# Patient Record
Sex: MALE | Race: BLACK OR AFRICAN AMERICAN
[De-identification: names, ages, dates, MRNs, and addresses within clinical notes are randomized per-mention and may not be internally consistent; named-entity substitution may affect disease eponyms.]

---

## 2017-03-07 ENCOUNTER — HOSPITAL ENCOUNTER (OUTPATIENT)
Dept: HOSPITAL 15 - CHF HDHVI | Age: 67
Discharge: HOME | End: 2017-03-07
Attending: INTERNAL MEDICINE
Payer: MEDICARE

## 2017-03-07 VITALS — SYSTOLIC BLOOD PRESSURE: 126 MMHG | DIASTOLIC BLOOD PRESSURE: 80 MMHG

## 2017-03-07 DIAGNOSIS — I10: Primary | ICD-10-CM

## 2017-03-07 DIAGNOSIS — E83.40: ICD-10-CM

## 2017-03-07 DIAGNOSIS — D64.9: ICD-10-CM

## 2017-03-07 DIAGNOSIS — E11.9: ICD-10-CM

## 2017-03-07 LAB
ALBUMIN SERPL-MCNC: 3.7 G/DL (ref 3.4–5)
ALP SERPL-CCNC: 114 U/L (ref 45–117)
ANION GAP SERPL CALCULATED.3IONS-SCNC: 13 MMOL/L (ref 5–15)
BILIRUB SERPL-MCNC: 0.4 MG/DL (ref 0.2–1)
BUN SERPL-MCNC: 26 MG/DL (ref 7–18)
BUN/CREAT SERPL: 18.3
CALCIUM SERPL-MCNC: 8.7 MG/DL (ref 8.5–10.1)
CHLORIDE SERPL-SCNC: 112 MMOL/L (ref 98–107)
CO2 SERPL-SCNC: 21 MMOL/L (ref 21–32)
DEFINITIVE: (no result)
GLUCOSE SERPL-MCNC: 97 MG/DL (ref 74–106)
HCT VFR BLD AUTO: 30.8 % (ref 41–53)
HGB BLD-MCNC: 9.6 G/DL (ref 13.5–17.5)
MAGNESIUM SERPL-MCNC: 2.4 MG/DL (ref 1.6–2.6)
MCH RBC QN AUTO: 21.3 PG (ref 28–32)
MCV RBC AUTO: 68.4 FL (ref 80–100)
NRBC BLD QL AUTO: 0 %
POTASSIUM SERPL-SCNC: 4 MMOL/L (ref 3.5–5.1)
PROT SERPL-MCNC: 7.5 G/DL (ref 6.4–8.2)
SCHISTOCYTES BLD QL SMEAR: (no result)
SODIUM SERPL-SCNC: 146 MMOL/L (ref 136–145)
SUSPECT: (no result)

## 2017-03-07 PROCEDURE — 83036 HEMOGLOBIN GLYCOSYLATED A1C: CPT

## 2017-03-07 PROCEDURE — 80053 COMPREHEN METABOLIC PANEL: CPT

## 2017-03-07 PROCEDURE — 85025 COMPLETE CBC W/AUTO DIFF WBC: CPT

## 2017-03-07 PROCEDURE — 36415 COLL VENOUS BLD VENIPUNCTURE: CPT

## 2017-03-07 PROCEDURE — 83735 ASSAY OF MAGNESIUM: CPT

## 2017-03-07 PROCEDURE — 96372 THER/PROPH/DIAG INJ SC/IM: CPT

## 2017-03-07 PROCEDURE — 93701 BIOIMPEDANCE CV ANALYSIS: CPT

## 2017-03-14 ENCOUNTER — HOSPITAL ENCOUNTER (OUTPATIENT)
Dept: HOSPITAL 15 - CHF HDHVI | Age: 67
Discharge: HOME | End: 2017-03-14
Attending: INTERNAL MEDICINE
Payer: MEDICARE

## 2017-03-14 VITALS — SYSTOLIC BLOOD PRESSURE: 105 MMHG | DIASTOLIC BLOOD PRESSURE: 70 MMHG

## 2017-03-14 DIAGNOSIS — E11.9: ICD-10-CM

## 2017-03-14 DIAGNOSIS — I11.0: Primary | ICD-10-CM

## 2017-03-14 DIAGNOSIS — D64.9: ICD-10-CM

## 2017-03-14 DIAGNOSIS — I50.9: ICD-10-CM

## 2017-03-14 LAB
INR PPP: 2.5 (ref 0.7–1.3)
PROTHROMBIN TIME: 30 SEC (ref 9–12)

## 2017-03-14 PROCEDURE — 85610 PROTHROMBIN TIME: CPT

## 2017-03-14 PROCEDURE — 96372 THER/PROPH/DIAG INJ SC/IM: CPT

## 2017-03-29 ENCOUNTER — HOSPITAL ENCOUNTER (OUTPATIENT)
Dept: HOSPITAL 15 - CHF HDHVI | Age: 67
Discharge: HOME | End: 2017-03-29
Attending: INTERNAL MEDICINE
Payer: MEDICARE

## 2017-03-29 VITALS — DIASTOLIC BLOOD PRESSURE: 63 MMHG | SYSTOLIC BLOOD PRESSURE: 115 MMHG

## 2017-03-29 VITALS — WEIGHT: 1.06 LBS | BODY MASS INDEX: 0.21 KG/M2 | HEIGHT: 60 IN

## 2017-03-29 VITALS — SYSTOLIC BLOOD PRESSURE: 109 MMHG | DIASTOLIC BLOOD PRESSURE: 59 MMHG

## 2017-03-29 DIAGNOSIS — R94.4: ICD-10-CM

## 2017-03-29 DIAGNOSIS — I10: ICD-10-CM

## 2017-03-29 DIAGNOSIS — D64.9: ICD-10-CM

## 2017-03-29 DIAGNOSIS — E55.9: ICD-10-CM

## 2017-03-29 DIAGNOSIS — I50.9: Primary | ICD-10-CM

## 2017-03-29 LAB
BUN SERPL-MCNC: 20 MG/DL (ref 7–18)
DEFINITIVE: (no result)
HCT VFR BLD AUTO: 32.6 % (ref 41–53)
HGB BLD-MCNC: 10.1 G/DL (ref 13.5–17.5)
INR PPP: 2.7 (ref 0.7–1.3)
MCH RBC QN AUTO: 21.6 PG (ref 28–32)
MCV RBC AUTO: 69.3 FL (ref 80–100)
NRBC BLD QL AUTO: 0 %
POTASSIUM SERPL-SCNC: 4.3 MMOL/L (ref 3.5–5.1)
PROTHROMBIN TIME: 32.1 SEC (ref 9–12)

## 2017-03-29 PROCEDURE — 82962 GLUCOSE BLOOD TEST: CPT

## 2017-03-29 PROCEDURE — 82565 ASSAY OF CREATININE: CPT

## 2017-03-29 PROCEDURE — 36415 COLL VENOUS BLD VENIPUNCTURE: CPT

## 2017-03-29 PROCEDURE — 85025 COMPLETE CBC W/AUTO DIFF WBC: CPT

## 2017-03-29 PROCEDURE — 84520 ASSAY OF UREA NITROGEN: CPT

## 2017-03-29 PROCEDURE — 82306 VITAMIN D 25 HYDROXY: CPT

## 2017-03-29 PROCEDURE — 85610 PROTHROMBIN TIME: CPT

## 2017-03-29 PROCEDURE — 96372 THER/PROPH/DIAG INJ SC/IM: CPT

## 2017-03-29 PROCEDURE — 84132 ASSAY OF SERUM POTASSIUM: CPT

## 2017-05-04 ENCOUNTER — HOSPITAL ENCOUNTER (OUTPATIENT)
Dept: HOSPITAL 15 - CHF HDHVI | Age: 67
Discharge: HOME | End: 2017-05-04
Attending: INTERNAL MEDICINE
Payer: MEDICARE

## 2017-05-04 VITALS — SYSTOLIC BLOOD PRESSURE: 135 MMHG | DIASTOLIC BLOOD PRESSURE: 73 MMHG

## 2017-05-04 VITALS — DIASTOLIC BLOOD PRESSURE: 82 MMHG | SYSTOLIC BLOOD PRESSURE: 128 MMHG

## 2017-05-04 DIAGNOSIS — I11.0: Primary | ICD-10-CM

## 2017-05-04 DIAGNOSIS — E78.5: ICD-10-CM

## 2017-05-04 DIAGNOSIS — E11.9: ICD-10-CM

## 2017-05-04 DIAGNOSIS — Z95.1: ICD-10-CM

## 2017-05-04 DIAGNOSIS — I25.10: ICD-10-CM

## 2017-05-04 DIAGNOSIS — E55.9: ICD-10-CM

## 2017-05-04 DIAGNOSIS — I73.9: ICD-10-CM

## 2017-05-04 DIAGNOSIS — D51.9: ICD-10-CM

## 2017-05-04 LAB
INR PPP: 1.1 (ref 0.7–1.3)
PROTHROMBIN TIME: 13.6 SEC (ref 9–12)

## 2017-05-04 PROCEDURE — 85610 PROTHROMBIN TIME: CPT

## 2017-05-04 PROCEDURE — 96372 THER/PROPH/DIAG INJ SC/IM: CPT

## 2018-11-08 ENCOUNTER — HOSPITAL ENCOUNTER (OUTPATIENT)
Dept: HOSPITAL 15 - RAD HDHVI | Age: 68
Discharge: HOME | End: 2018-11-08
Attending: INTERNAL MEDICINE
Payer: MEDICARE

## 2018-11-08 VITALS — WEIGHT: 198 LBS | HEIGHT: 73 IN | BODY MASS INDEX: 26.24 KG/M2

## 2018-11-08 DIAGNOSIS — R19.09: ICD-10-CM

## 2018-11-08 DIAGNOSIS — I11.0: ICD-10-CM

## 2018-11-08 DIAGNOSIS — I50.23: ICD-10-CM

## 2018-11-08 DIAGNOSIS — I42.0: Primary | ICD-10-CM

## 2018-11-08 DIAGNOSIS — E78.00: ICD-10-CM

## 2018-11-08 DIAGNOSIS — E11.9: ICD-10-CM

## 2018-11-08 PROCEDURE — 78452 HT MUSCLE IMAGE SPECT MULT: CPT

## 2018-11-08 PROCEDURE — 96374 THER/PROPH/DIAG INJ IV PUSH: CPT

## 2018-11-08 PROCEDURE — 93005 ELECTROCARDIOGRAM TRACING: CPT

## 2018-11-08 PROCEDURE — 96375 TX/PRO/DX INJ NEW DRUG ADDON: CPT

## 2018-11-09 ENCOUNTER — HOSPITAL ENCOUNTER (OUTPATIENT)
Dept: HOSPITAL 15 - RAD HDHVI | Age: 68
Discharge: HOME | End: 2018-11-09
Attending: INTERNAL MEDICINE
Payer: MEDICARE

## 2018-11-09 DIAGNOSIS — I42.0: Primary | ICD-10-CM

## 2018-11-09 DIAGNOSIS — E11.22: ICD-10-CM

## 2018-11-09 DIAGNOSIS — N18.2: ICD-10-CM

## 2018-11-09 DIAGNOSIS — I13.0: ICD-10-CM

## 2018-11-09 DIAGNOSIS — I50.23: ICD-10-CM

## 2018-11-09 PROCEDURE — 93306 TTE W/DOPPLER COMPLETE: CPT

## 2018-11-12 ENCOUNTER — HOSPITAL ENCOUNTER (OUTPATIENT)
Dept: HOSPITAL 15 - RAD HDHVI | Age: 68
Discharge: HOME | End: 2018-11-12
Attending: INTERNAL MEDICINE
Payer: MEDICARE

## 2018-11-12 DIAGNOSIS — I10: ICD-10-CM

## 2018-11-12 DIAGNOSIS — R06.02: ICD-10-CM

## 2018-11-12 DIAGNOSIS — R79.1: ICD-10-CM

## 2018-11-12 DIAGNOSIS — D64.9: ICD-10-CM

## 2018-11-12 DIAGNOSIS — Z01.818: Primary | ICD-10-CM

## 2018-11-12 LAB
ANION GAP SERPL CALCULATED.3IONS-SCNC: 8 MMOL/L (ref 5–15)
APTT PPP: 26.5 SEC (ref 23.78–33.04)
BUN SERPL-MCNC: 43 MG/DL (ref 7–18)
BUN/CREAT SERPL: 25.9
CALCIUM SERPL-MCNC: 8.6 MG/DL (ref 8.5–10.1)
CHLORIDE SERPL-SCNC: 113 MMOL/L (ref 98–107)
CO2 SERPL-SCNC: 21 MMOL/L (ref 21–32)
GLUCOSE SERPL-MCNC: 62 MG/DL (ref 74–106)
HCT VFR BLD AUTO: 35.5 % (ref 41–53)
HGB BLD-MCNC: 11.4 G/DL (ref 13.5–17.5)
INR PPP: 1 (ref 0.9–1.15)
MCH RBC QN AUTO: 26.3 PG (ref 28–32)
MCV RBC AUTO: 81.9 FL (ref 80–100)
NRBC BLD QL AUTO: 0.4 %
POTASSIUM SERPL-SCNC: 3.6 MMOL/L (ref 3.5–5.1)
PROTHROMBIN TIME: 10.7 SEC (ref 9.27–12.13)
SODIUM SERPL-SCNC: 142 MMOL/L (ref 136–145)

## 2018-11-12 PROCEDURE — 85025 COMPLETE CBC W/AUTO DIFF WBC: CPT

## 2018-11-12 PROCEDURE — 71046 X-RAY EXAM CHEST 2 VIEWS: CPT

## 2018-11-12 PROCEDURE — 36415 COLL VENOUS BLD VENIPUNCTURE: CPT

## 2018-11-12 PROCEDURE — 85610 PROTHROMBIN TIME: CPT

## 2018-11-12 PROCEDURE — 80048 BASIC METABOLIC PNL TOTAL CA: CPT

## 2018-11-12 PROCEDURE — 85730 THROMBOPLASTIN TIME PARTIAL: CPT

## 2018-11-15 ENCOUNTER — HOSPITAL ENCOUNTER (OUTPATIENT)
Dept: HOSPITAL 15 - LAB | Age: 68
Discharge: HOME | End: 2018-11-15
Attending: INTERNAL MEDICINE
Payer: MEDICARE

## 2018-11-15 DIAGNOSIS — N39.0: Primary | ICD-10-CM

## 2018-11-15 PROCEDURE — 87086 URINE CULTURE/COLONY COUNT: CPT

## 2018-11-15 PROCEDURE — 81003 URINALYSIS AUTO W/O SCOPE: CPT

## 2019-08-19 ENCOUNTER — HOSPITAL ENCOUNTER (OUTPATIENT)
Dept: HOSPITAL 15 - CHF HDHVI | Age: 69
Discharge: HOME | End: 2019-08-19
Attending: INTERNAL MEDICINE
Payer: MEDICARE

## 2019-08-19 VITALS — DIASTOLIC BLOOD PRESSURE: 70 MMHG | SYSTOLIC BLOOD PRESSURE: 121 MMHG

## 2019-08-19 DIAGNOSIS — E83.40: ICD-10-CM

## 2019-08-19 DIAGNOSIS — I50.9: ICD-10-CM

## 2019-08-19 DIAGNOSIS — I11.0: Primary | ICD-10-CM

## 2019-08-19 DIAGNOSIS — D64.9: ICD-10-CM

## 2019-08-19 LAB
ANION GAP SERPL CALCULATED.3IONS-SCNC: 7 MMOL/L (ref 5–15)
BUN SERPL-MCNC: 23 MG/DL (ref 7–18)
BUN/CREAT SERPL: 19.7
CALCIUM SERPL-MCNC: 8.3 MG/DL (ref 8.5–10.1)
CHLORIDE SERPL-SCNC: 118 MMOL/L (ref 98–107)
CO2 SERPL-SCNC: 21 MMOL/L (ref 21–32)
GLUCOSE SERPL-MCNC: 64 MG/DL (ref 74–106)
HCT VFR BLD AUTO: 30 % (ref 41–53)
HGB BLD-MCNC: 9.2 G/DL (ref 13.5–17.5)
MAGNESIUM SERPL-MCNC: 2.3 MG/DL (ref 1.6–2.6)
MCH RBC QN AUTO: 24.7 PG (ref 28–32)
MCV RBC AUTO: 81 FL (ref 80–100)
NRBC BLD QL AUTO: 0.2 %
POTASSIUM SERPL-SCNC: 3.7 MMOL/L (ref 3.5–5.1)
SODIUM SERPL-SCNC: 146 MMOL/L (ref 136–145)

## 2019-08-19 PROCEDURE — 71046 X-RAY EXAM CHEST 2 VIEWS: CPT

## 2019-08-19 PROCEDURE — 36415 COLL VENOUS BLD VENIPUNCTURE: CPT

## 2019-08-19 PROCEDURE — 83735 ASSAY OF MAGNESIUM: CPT

## 2019-08-19 PROCEDURE — 93701 BIOIMPEDANCE CV ANALYSIS: CPT

## 2019-08-19 PROCEDURE — 80048 BASIC METABOLIC PNL TOTAL CA: CPT

## 2019-08-19 PROCEDURE — 85025 COMPLETE CBC W/AUTO DIFF WBC: CPT

## 2019-08-19 PROCEDURE — 83880 ASSAY OF NATRIURETIC PEPTIDE: CPT

## 2019-08-19 NOTE — NUR
CHF

PT ARRIVED TO THE CHF CLINIC TO RESUME CHF FOLLOW UP CARE. NOT SEEN SINCE 2017 . PT A/O X 3 
VSS, WOUND VAC TO THE LEFT  FOOT CHRONIC DM ULCER.

## 2019-08-19 NOTE — NUR
Discharge Instructions

See e-MAR for any mediations given with this visit.  Patient education given on disease 
process. Patient verbalized understanding.  Previous labs reviewed.  Patient discharged in 
stable condition with after care instructions and follow up appointment.



CARDIODYNAMIC AND CHEST XRAY COMPLETE

-------------------------------------------------------------------------------

Addendum: 08/19/19 at 1708 by THIAGO DOMINGUEZ RN RN HI

-------------------------------------------------------------------------------

FOLLOW UP IN 1 WEEK

## 2019-08-28 ENCOUNTER — HOSPITAL ENCOUNTER (OUTPATIENT)
Dept: HOSPITAL 15 - RAD HDHVI | Age: 69
Discharge: HOME | End: 2019-08-28
Attending: INTERNAL MEDICINE
Payer: MEDICARE

## 2019-08-28 DIAGNOSIS — I70.201: Primary | ICD-10-CM

## 2019-08-28 DIAGNOSIS — R10.9: ICD-10-CM

## 2019-08-28 PROCEDURE — 93926 LOWER EXTREMITY STUDY: CPT

## 2019-09-06 ENCOUNTER — HOSPITAL ENCOUNTER (OUTPATIENT)
Dept: HOSPITAL 15 - RAD HDHVI | Age: 69
Discharge: HOME | End: 2019-09-06
Attending: INTERNAL MEDICINE
Payer: MEDICARE

## 2019-09-06 VITALS — WEIGHT: 193 LBS | BODY MASS INDEX: 25.58 KG/M2 | HEIGHT: 73 IN

## 2019-09-06 DIAGNOSIS — E78.00: ICD-10-CM

## 2019-09-06 DIAGNOSIS — I25.10: ICD-10-CM

## 2019-09-06 DIAGNOSIS — Z95.1: ICD-10-CM

## 2019-09-06 DIAGNOSIS — I50.9: ICD-10-CM

## 2019-09-06 DIAGNOSIS — I11.0: ICD-10-CM

## 2019-09-06 DIAGNOSIS — R07.9: Primary | ICD-10-CM

## 2019-09-06 DIAGNOSIS — I21.9: ICD-10-CM

## 2019-09-06 DIAGNOSIS — F17.200: ICD-10-CM

## 2019-09-06 PROCEDURE — 78452 HT MUSCLE IMAGE SPECT MULT: CPT

## 2019-09-06 PROCEDURE — 93005 ELECTROCARDIOGRAM TRACING: CPT

## 2019-09-06 PROCEDURE — 96374 THER/PROPH/DIAG INJ IV PUSH: CPT

## 2019-09-06 PROCEDURE — 96375 TX/PRO/DX INJ NEW DRUG ADDON: CPT

## 2019-09-09 ENCOUNTER — HOSPITAL ENCOUNTER (OUTPATIENT)
Dept: HOSPITAL 15 - CHF HDHVI | Age: 69
Discharge: HOME | End: 2019-09-09
Attending: INTERNAL MEDICINE
Payer: MEDICARE

## 2019-09-09 VITALS — DIASTOLIC BLOOD PRESSURE: 64 MMHG | SYSTOLIC BLOOD PRESSURE: 124 MMHG

## 2019-09-09 VITALS — DIASTOLIC BLOOD PRESSURE: 54 MMHG | SYSTOLIC BLOOD PRESSURE: 137 MMHG

## 2019-09-09 DIAGNOSIS — N18.9: ICD-10-CM

## 2019-09-09 DIAGNOSIS — E55.9: ICD-10-CM

## 2019-09-09 DIAGNOSIS — R03.0: ICD-10-CM

## 2019-09-09 DIAGNOSIS — I13.0: Primary | ICD-10-CM

## 2019-09-09 DIAGNOSIS — L97.509: ICD-10-CM

## 2019-09-09 DIAGNOSIS — E11.22: ICD-10-CM

## 2019-09-09 DIAGNOSIS — I50.9: ICD-10-CM

## 2019-09-09 PROCEDURE — 85610 PROTHROMBIN TIME: CPT

## 2019-09-09 NOTE — NUR
IN FOR CHF FOLLOWUP WITH WIFE IN ATTENDANCE. VS WNL. REVIEWED LABS AND STATUS. WRITTEN AND 
VERBAL INSTRUCTION ON ADDING HIGH POTASSIUM FOODS TO DIET. VERBAL INSTRUCTION AND 
DEMONSTRATION ON ADDING VITAMIN D SPRAY TO  DAILY REGIMEN.  REPEAT BACK INSTRUCTIONS 
OBTAINED. INR CHECKED DUE TO PATIENTS STATING ON BLOOD THINNERS. DISCHARGED TO SELF CARE IN 
NO DISTRESS WITH WIFE IN ATTENDANCE.

## 2019-10-09 ENCOUNTER — HOSPITAL ENCOUNTER (OUTPATIENT)
Dept: HOSPITAL 15 - CHF HDHVI | Age: 69
Discharge: HOME | End: 2019-10-09
Attending: INTERNAL MEDICINE
Payer: MEDICARE

## 2019-10-09 VITALS — SYSTOLIC BLOOD PRESSURE: 127 MMHG | DIASTOLIC BLOOD PRESSURE: 66 MMHG

## 2019-10-09 VITALS — SYSTOLIC BLOOD PRESSURE: 107 MMHG | DIASTOLIC BLOOD PRESSURE: 71 MMHG

## 2019-10-09 DIAGNOSIS — E78.00: ICD-10-CM

## 2019-10-09 DIAGNOSIS — D64.9: ICD-10-CM

## 2019-10-09 DIAGNOSIS — I25.10: ICD-10-CM

## 2019-10-09 DIAGNOSIS — E11.22: ICD-10-CM

## 2019-10-09 DIAGNOSIS — E83.40: ICD-10-CM

## 2019-10-09 DIAGNOSIS — N18.9: ICD-10-CM

## 2019-10-09 DIAGNOSIS — Z87.891: ICD-10-CM

## 2019-10-09 DIAGNOSIS — Z95.1: ICD-10-CM

## 2019-10-09 DIAGNOSIS — I13.0: Primary | ICD-10-CM

## 2019-10-09 DIAGNOSIS — I50.22: ICD-10-CM

## 2019-10-09 DIAGNOSIS — I48.91: ICD-10-CM

## 2019-10-09 DIAGNOSIS — R53.83: ICD-10-CM

## 2019-10-09 LAB
ALBUMIN SERPL-MCNC: 3.7 G/DL (ref 3.4–5)
ALP SERPL-CCNC: 135 U/L (ref 45–117)
ALT SERPL-CCNC: 34 U/L (ref 16–61)
ANION GAP SERPL CALCULATED.3IONS-SCNC: 7 MMOL/L (ref 5–15)
BILIRUB SERPL-MCNC: 0.3 MG/DL (ref 0.2–1)
BUN SERPL-MCNC: 38 MG/DL (ref 7–18)
BUN/CREAT SERPL: 21.3
CALCIUM SERPL-MCNC: 8.6 MG/DL (ref 8.5–10.1)
CHLORIDE SERPL-SCNC: 110 MMOL/L (ref 98–107)
CO2 SERPL-SCNC: 23 MMOL/L (ref 21–32)
GLUCOSE SERPL-MCNC: 119 MG/DL (ref 74–106)
HCT VFR BLD AUTO: 36.9 % (ref 41–53)
HGB BLD-MCNC: 11.9 G/DL (ref 13.5–17.5)
MAGNESIUM SERPL-MCNC: 2.3 MG/DL (ref 1.6–2.6)
MCH RBC QN AUTO: 29.5 PG (ref 28–32)
MCV RBC AUTO: 91.5 FL (ref 80–100)
NRBC BLD QL AUTO: 0.1 %
POTASSIUM SERPL-SCNC: 3.6 MMOL/L (ref 3.5–5.1)
PROT SERPL-MCNC: 7.3 G/DL (ref 6.4–8.2)
SODIUM SERPL-SCNC: 140 MMOL/L (ref 136–145)

## 2019-10-09 PROCEDURE — 83880 ASSAY OF NATRIURETIC PEPTIDE: CPT

## 2019-10-09 PROCEDURE — 96372 THER/PROPH/DIAG INJ SC/IM: CPT

## 2019-10-09 PROCEDURE — 83735 ASSAY OF MAGNESIUM: CPT

## 2019-10-09 PROCEDURE — 36415 COLL VENOUS BLD VENIPUNCTURE: CPT

## 2019-10-09 PROCEDURE — 80053 COMPREHEN METABOLIC PANEL: CPT

## 2019-10-09 PROCEDURE — 93701 BIOIMPEDANCE CV ANALYSIS: CPT

## 2019-10-09 PROCEDURE — 85025 COMPLETE CBC W/AUTO DIFF WBC: CPT

## 2019-10-09 NOTE — NUR
IN TO CLINIC WITH FAMILY IN ATTENDANCE. LABS REVIEWED. ADDITIONAL LABS DRAWN AND SENT. 
ADMINISTERED MEDICATION PER ORDER. 



MEDICATION ADMINISTRATION

VIT B12 1000 MCG IM TO LEFT DELTOID AT 1335

## 2019-10-11 ENCOUNTER — HOSPITAL ENCOUNTER (OUTPATIENT)
Dept: HOSPITAL 15 - CHF HDHVI | Age: 69
Discharge: HOME | End: 2019-10-11
Attending: INTERNAL MEDICINE
Payer: MEDICARE

## 2019-10-11 DIAGNOSIS — N18.9: ICD-10-CM

## 2019-10-11 DIAGNOSIS — I13.0: ICD-10-CM

## 2019-10-11 DIAGNOSIS — E11.22: ICD-10-CM

## 2019-10-11 DIAGNOSIS — I50.9: ICD-10-CM

## 2019-10-11 DIAGNOSIS — E87.6: Primary | ICD-10-CM

## 2019-10-11 DIAGNOSIS — R94.4: ICD-10-CM

## 2019-10-11 LAB
BUN SERPL-MCNC: 53 MG/DL (ref 7–18)
POTASSIUM SERPL-SCNC: 3.7 MMOL/L (ref 3.5–5.1)

## 2019-10-11 PROCEDURE — 82565 ASSAY OF CREATININE: CPT

## 2019-10-11 PROCEDURE — 84520 ASSAY OF UREA NITROGEN: CPT

## 2019-10-11 PROCEDURE — 84132 ASSAY OF SERUM POTASSIUM: CPT

## 2019-10-11 PROCEDURE — 36415 COLL VENOUS BLD VENIPUNCTURE: CPT

## 2019-10-15 ENCOUNTER — HOSPITAL ENCOUNTER (OUTPATIENT)
Dept: HOSPITAL 15 - CHF HDHVI | Age: 69
Discharge: HOME | End: 2019-10-15
Attending: INTERNAL MEDICINE
Payer: MEDICARE

## 2019-10-15 VITALS — SYSTOLIC BLOOD PRESSURE: 131 MMHG | DIASTOLIC BLOOD PRESSURE: 78 MMHG

## 2019-10-15 VITALS — DIASTOLIC BLOOD PRESSURE: 69 MMHG | SYSTOLIC BLOOD PRESSURE: 117 MMHG

## 2019-10-15 DIAGNOSIS — I50.9: ICD-10-CM

## 2019-10-15 DIAGNOSIS — E87.6: ICD-10-CM

## 2019-10-15 DIAGNOSIS — R94.4: Primary | ICD-10-CM

## 2019-10-15 DIAGNOSIS — E11.22: ICD-10-CM

## 2019-10-15 DIAGNOSIS — N18.9: ICD-10-CM

## 2019-10-15 DIAGNOSIS — I13.0: ICD-10-CM

## 2019-10-15 LAB
BUN SERPL-MCNC: 40 MG/DL (ref 7–18)
POTASSIUM SERPL-SCNC: 4.2 MMOL/L (ref 3.5–5.1)

## 2019-10-15 PROCEDURE — 82565 ASSAY OF CREATININE: CPT

## 2019-10-15 PROCEDURE — 36415 COLL VENOUS BLD VENIPUNCTURE: CPT

## 2019-10-15 PROCEDURE — 84132 ASSAY OF SERUM POTASSIUM: CPT

## 2019-10-15 PROCEDURE — 84520 ASSAY OF UREA NITROGEN: CPT

## 2019-10-15 NOTE — NUR
IN TO CLINIC FOR CHF FOLLOWUP AND REPEAT LABS. PT HAS HAD INCREASED CREATININE WITH REPEAT 
LEVELS DRAWN. MEDICATION RECONCILIATION DONE AND VERIFIED WITH PATIENT AND PATIENTS WIFE. PT 
REPORTS DRINKING PLENTY OF FLUID AND REPORTS COMPLIANCE WITH MEDICATION REGIMEN. NO CHANGES. 
DENIES TAKING ANY ADDITIONAL HERBALS.  LABS DRAWN AND SENT. WILL VERIFY REPEAT RESULTS AND 
FOLLOW WITH DR MAC . PT TO RTC IN 1 WEEK.

## 2019-11-05 ENCOUNTER — HOSPITAL ENCOUNTER (OUTPATIENT)
Dept: HOSPITAL 15 - RAD HDHVI | Age: 69
Discharge: HOME | End: 2019-11-05
Attending: INTERNAL MEDICINE
Payer: MEDICARE

## 2019-11-05 VITALS — SYSTOLIC BLOOD PRESSURE: 146 MMHG | DIASTOLIC BLOOD PRESSURE: 85 MMHG

## 2019-11-05 VITALS — DIASTOLIC BLOOD PRESSURE: 83 MMHG | SYSTOLIC BLOOD PRESSURE: 149 MMHG

## 2019-11-05 DIAGNOSIS — I42.9: ICD-10-CM

## 2019-11-05 DIAGNOSIS — J44.9: ICD-10-CM

## 2019-11-05 DIAGNOSIS — I13.0: ICD-10-CM

## 2019-11-05 DIAGNOSIS — Z95.811: ICD-10-CM

## 2019-11-05 DIAGNOSIS — E11.22: ICD-10-CM

## 2019-11-05 DIAGNOSIS — N18.9: ICD-10-CM

## 2019-11-05 DIAGNOSIS — F17.200: ICD-10-CM

## 2019-11-05 DIAGNOSIS — I50.9: ICD-10-CM

## 2019-11-05 DIAGNOSIS — Z01.812: Primary | ICD-10-CM

## 2019-11-05 LAB
ANION GAP SERPL CALCULATED.3IONS-SCNC: 6 MMOL/L (ref 5–15)
APTT PPP: 28.5 SEC (ref 23.64–32.05)
BUN SERPL-MCNC: 37 MG/DL (ref 7–18)
BUN/CREAT SERPL: 22.6
CALCIUM SERPL-MCNC: 9.2 MG/DL (ref 8.5–10.1)
CHLORIDE SERPL-SCNC: 107 MMOL/L (ref 98–107)
CO2 SERPL-SCNC: 26 MMOL/L (ref 21–32)
GLUCOSE SERPL-MCNC: 110 MG/DL (ref 74–106)
HCT VFR BLD AUTO: 42.7 % (ref 41–53)
HGB BLD-MCNC: 13.8 G/DL (ref 13.5–17.5)
INR PPP: 0.97 (ref 0.9–1.15)
MCH RBC QN AUTO: 30.2 PG (ref 28–32)
MCV RBC AUTO: 93.6 FL (ref 80–100)
NRBC BLD QL AUTO: 0 %
POTASSIUM SERPL-SCNC: 4.2 MMOL/L (ref 3.5–5.1)
SODIUM SERPL-SCNC: 139 MMOL/L (ref 136–145)

## 2019-11-05 PROCEDURE — 85025 COMPLETE CBC W/AUTO DIFF WBC: CPT

## 2019-11-05 PROCEDURE — 85730 THROMBOPLASTIN TIME PARTIAL: CPT

## 2019-11-05 PROCEDURE — 93005 ELECTROCARDIOGRAM TRACING: CPT

## 2019-11-05 PROCEDURE — 80048 BASIC METABOLIC PNL TOTAL CA: CPT

## 2019-11-05 PROCEDURE — 71046 X-RAY EXAM CHEST 2 VIEWS: CPT

## 2019-11-05 PROCEDURE — 36415 COLL VENOUS BLD VENIPUNCTURE: CPT

## 2019-11-05 PROCEDURE — 85610 PROTHROMBIN TIME: CPT

## 2019-11-05 NOTE — NUR
Pre-Op Discharge Summary:

See e-MAR for any medications given for this visit.  Pre-op orders received and carried out 
per MD of EKG, LABS and chest xrays.  Patient given a copy of EKG with instructions to go to 
[facility] out patient for further follow up care.

## 2019-11-07 ENCOUNTER — HOSPITAL ENCOUNTER (OUTPATIENT)
Dept: HOSPITAL 15 - CATH | Age: 69
Discharge: HOME | End: 2019-11-07
Attending: INTERNAL MEDICINE
Payer: MEDICARE

## 2019-11-07 VITALS — HEIGHT: 73 IN | WEIGHT: 194 LBS | BODY MASS INDEX: 25.71 KG/M2

## 2019-11-07 DIAGNOSIS — Z79.899: ICD-10-CM

## 2019-11-07 DIAGNOSIS — I25.2: ICD-10-CM

## 2019-11-07 DIAGNOSIS — J44.9: ICD-10-CM

## 2019-11-07 DIAGNOSIS — Z95.0: ICD-10-CM

## 2019-11-07 DIAGNOSIS — I25.10: ICD-10-CM

## 2019-11-07 DIAGNOSIS — E78.00: ICD-10-CM

## 2019-11-07 DIAGNOSIS — Z85.828: ICD-10-CM

## 2019-11-07 DIAGNOSIS — Z95.1: ICD-10-CM

## 2019-11-07 DIAGNOSIS — Z95.5: ICD-10-CM

## 2019-11-07 DIAGNOSIS — I10: ICD-10-CM

## 2019-11-07 DIAGNOSIS — Z79.84: ICD-10-CM

## 2019-11-07 DIAGNOSIS — E11.9: ICD-10-CM

## 2019-11-07 DIAGNOSIS — F17.210: ICD-10-CM

## 2019-11-07 DIAGNOSIS — I73.9: Primary | ICD-10-CM

## 2019-11-07 PROCEDURE — 99152 MOD SED SAME PHYS/QHP 5/>YRS: CPT

## 2019-11-07 PROCEDURE — 75716 ARTERY X-RAYS ARMS/LEGS: CPT

## 2019-11-07 PROCEDURE — 36246 INS CATH ABD/L-EXT ART 2ND: CPT

## 2019-12-02 ENCOUNTER — HOSPITAL ENCOUNTER (OUTPATIENT)
Dept: HOSPITAL 15 - RAD HDHVI | Age: 69
Discharge: HOME | End: 2019-12-02
Attending: INTERNAL MEDICINE
Payer: MEDICARE

## 2019-12-02 VITALS — DIASTOLIC BLOOD PRESSURE: 81 MMHG | SYSTOLIC BLOOD PRESSURE: 136 MMHG

## 2019-12-02 VITALS — SYSTOLIC BLOOD PRESSURE: 134 MMHG | DIASTOLIC BLOOD PRESSURE: 74 MMHG

## 2019-12-02 DIAGNOSIS — Z87.891: ICD-10-CM

## 2019-12-02 DIAGNOSIS — I11.0: ICD-10-CM

## 2019-12-02 DIAGNOSIS — M47.814: ICD-10-CM

## 2019-12-02 DIAGNOSIS — R05: ICD-10-CM

## 2019-12-02 DIAGNOSIS — J92.9: ICD-10-CM

## 2019-12-02 DIAGNOSIS — Z01.812: Primary | ICD-10-CM

## 2019-12-02 DIAGNOSIS — I50.9: ICD-10-CM

## 2019-12-02 LAB
ANION GAP SERPL CALCULATED.3IONS-SCNC: 7 MMOL/L (ref 5–15)
APTT PPP: 28.5 SEC (ref 23.64–32.05)
BUN SERPL-MCNC: 39 MG/DL (ref 7–18)
BUN/CREAT SERPL: 22.7
CALCIUM SERPL-MCNC: 8.8 MG/DL (ref 8.5–10.1)
CHLORIDE SERPL-SCNC: 111 MMOL/L (ref 98–107)
CO2 SERPL-SCNC: 24 MMOL/L (ref 21–32)
GLUCOSE SERPL-MCNC: 90 MG/DL (ref 74–106)
HCT VFR BLD AUTO: 39.4 % (ref 41–53)
HGB BLD-MCNC: 13 G/DL (ref 13.5–17.5)
INR PPP: 1.06 (ref 0.9–1.15)
MCH RBC QN AUTO: 31 PG (ref 28–32)
MCV RBC AUTO: 94.2 FL (ref 80–100)
NRBC BLD QL AUTO: 0 %
POTASSIUM SERPL-SCNC: 3.8 MMOL/L (ref 3.5–5.1)
SODIUM SERPL-SCNC: 142 MMOL/L (ref 136–145)

## 2019-12-02 PROCEDURE — 85025 COMPLETE CBC W/AUTO DIFF WBC: CPT

## 2019-12-02 PROCEDURE — 71046 X-RAY EXAM CHEST 2 VIEWS: CPT

## 2019-12-02 PROCEDURE — 36415 COLL VENOUS BLD VENIPUNCTURE: CPT

## 2019-12-02 PROCEDURE — 85730 THROMBOPLASTIN TIME PARTIAL: CPT

## 2019-12-02 PROCEDURE — 80048 BASIC METABOLIC PNL TOTAL CA: CPT

## 2019-12-02 PROCEDURE — 93005 ELECTROCARDIOGRAM TRACING: CPT

## 2019-12-02 PROCEDURE — 85610 PROTHROMBIN TIME: CPT

## 2019-12-02 NOTE — NUR
Pre-Op Discharge Summary:

See e-MAR for any medications given for this visit.  Pre-op orders received and carried out 
per MD of EKG, LABS and chest xrays.  Patient given a copy of EKG with instructions to go to 
[facility] out patient for further follow up care.

## 2019-12-05 ENCOUNTER — HOSPITAL ENCOUNTER (INPATIENT)
Dept: HOSPITAL 15 - CATH | Age: 69
LOS: 1 days | Discharge: HOME HEALTH | DRG: 181 | End: 2019-12-06
Attending: INTERNAL MEDICINE | Admitting: INTERNAL MEDICINE
Payer: MEDICARE

## 2019-12-05 VITALS — DIASTOLIC BLOOD PRESSURE: 85 MMHG | SYSTOLIC BLOOD PRESSURE: 134 MMHG

## 2019-12-05 VITALS — BODY MASS INDEX: 25.98 KG/M2 | WEIGHT: 195.99 LBS | HEIGHT: 73 IN

## 2019-12-05 VITALS — SYSTOLIC BLOOD PRESSURE: 117 MMHG | DIASTOLIC BLOOD PRESSURE: 74 MMHG

## 2019-12-05 VITALS — SYSTOLIC BLOOD PRESSURE: 134 MMHG | DIASTOLIC BLOOD PRESSURE: 85 MMHG

## 2019-12-05 DIAGNOSIS — E78.5: ICD-10-CM

## 2019-12-05 DIAGNOSIS — Z82.49: ICD-10-CM

## 2019-12-05 DIAGNOSIS — I70.211: ICD-10-CM

## 2019-12-05 DIAGNOSIS — E11.51: Primary | ICD-10-CM

## 2019-12-05 DIAGNOSIS — Z79.899: ICD-10-CM

## 2019-12-05 DIAGNOSIS — Z95.1: ICD-10-CM

## 2019-12-05 DIAGNOSIS — I25.10: ICD-10-CM

## 2019-12-05 DIAGNOSIS — I10: ICD-10-CM

## 2019-12-05 DIAGNOSIS — J44.9: ICD-10-CM

## 2019-12-05 PROCEDURE — 37228: CPT

## 2019-12-05 PROCEDURE — 04CK3ZZ EXTIRPATION OF MATTER FROM RIGHT FEMORAL ARTERY, PERCUTANEOUS APPROACH: ICD-10-PCS | Performed by: INTERNAL MEDICINE

## 2019-12-05 PROCEDURE — 36415 COLL VENOUS BLD VENIPUNCTURE: CPT

## 2019-12-05 PROCEDURE — 75716 ARTERY X-RAYS ARMS/LEGS: CPT

## 2019-12-05 PROCEDURE — 047T3ZZ DILATION OF RIGHT PERONEAL ARTERY, PERCUTANEOUS APPROACH: ICD-10-PCS | Performed by: INTERNAL MEDICINE

## 2019-12-05 PROCEDURE — 82565 ASSAY OF CREATININE: CPT

## 2019-12-05 PROCEDURE — B41G1ZZ FLUOROSCOPY OF LEFT LOWER EXTREMITY ARTERIES USING LOW OSMOLAR CONTRAST: ICD-10-PCS | Performed by: INTERNAL MEDICINE

## 2019-12-05 PROCEDURE — B41F1ZZ FLUOROSCOPY OF RIGHT LOWER EXTREMITY ARTERIES USING LOW OSMOLAR CONTRAST: ICD-10-PCS | Performed by: INTERNAL MEDICINE

## 2019-12-05 PROCEDURE — 047K3Z1 DILATION OF RIGHT FEMORAL ARTERY USING DRUG-COATED BALLOON, PERCUTANEOUS APPROACH: ICD-10-PCS | Performed by: INTERNAL MEDICINE

## 2019-12-05 PROCEDURE — 37227: CPT

## 2019-12-05 PROCEDURE — 99152 MOD SED SAME PHYS/QHP 5/>YRS: CPT

## 2019-12-05 PROCEDURE — 82962 GLUCOSE BLOOD TEST: CPT

## 2019-12-05 PROCEDURE — 99153 MOD SED SAME PHYS/QHP EA: CPT

## 2019-12-05 PROCEDURE — 047M3ZZ DILATION OF RIGHT POPLITEAL ARTERY, PERCUTANEOUS APPROACH: ICD-10-PCS | Performed by: INTERNAL MEDICINE

## 2019-12-05 RX ADMIN — Medication SCH STRIP: at 17:35

## 2019-12-05 RX ADMIN — HUMAN INSULIN SCH UNITS: 100 INJECTION, SOLUTION SUBCUTANEOUS at 22:00

## 2019-12-05 RX ADMIN — HUMAN INSULIN SCH UNITS: 100 INJECTION, SOLUTION SUBCUTANEOUS at 17:34

## 2019-12-05 RX ADMIN — Medication SCH STRIP: at 22:25

## 2019-12-05 NOTE — NUR
Opening Shift Note

Assumed care of patient, awake and alert.  No S/S of distress/SOB or pain.  Instructed on 
POC and to call for assist PRN, will continue to monitor for changes Q1hr and PRN. Incision 
dry and intact.

## 2019-12-05 NOTE — NUR
Patient okay to sit at 30 degree angle at this time. 

Patients groin assessed no signs of bleeding or swelling noted, dressing is dry and intact. 
[facility] No distress noted. Will continue to monitor.

## 2019-12-05 NOTE — NUR
M/S Admit from Cath Lab

Report received from Jolly JOHNS. AUDREY HOFFMANN brought to bed 273b following peripheral 
Cardiac catheterization. Patient transferred to unit bed. Catheterization site assessed for 
any bleeding, redness or swelling. Left groin dressing clean, dry, and intact. Patient 
instructed on need to notify staff immediately if any pain, burning or wetness to site, and 
any lower back pain. Patient instructed to lie flat until 1630, patient verbalized 
understanding. Bed locked in lowest position, side rails up x2, call light within reach. 
Will continue to monitor q1hr and PRN for any changes.

## 2019-12-06 VITALS — DIASTOLIC BLOOD PRESSURE: 47 MMHG | SYSTOLIC BLOOD PRESSURE: 104 MMHG

## 2019-12-06 VITALS — SYSTOLIC BLOOD PRESSURE: 103 MMHG | DIASTOLIC BLOOD PRESSURE: 65 MMHG

## 2019-12-06 VITALS — DIASTOLIC BLOOD PRESSURE: 69 MMHG | SYSTOLIC BLOOD PRESSURE: 116 MMHG

## 2019-12-06 VITALS — SYSTOLIC BLOOD PRESSURE: 102 MMHG | DIASTOLIC BLOOD PRESSURE: 87 MMHG

## 2019-12-06 RX ADMIN — HUMAN INSULIN SCH UNITS: 100 INJECTION, SOLUTION SUBCUTANEOUS at 06:18

## 2019-12-06 RX ADMIN — Medication SCH STRIP: at 06:18

## 2019-12-06 RX ADMIN — Medication SCH STRIP: at 11:20

## 2019-12-06 RX ADMIN — HUMAN INSULIN SCH UNITS: 100 INJECTION, SOLUTION SUBCUTANEOUS at 11:20

## 2019-12-06 NOTE — NUR
Discharge planning per  consult, Dr. Abraham wrote orders for patient to do home health with 
[facility] [phone]. Referral faxed as per ordered by Dr. Abraham. Placed a follow up call, 
spoke with [facility] and was advised that they will accept this patient onto services and 
start of care will be within 24-48 hours upon discharge. 

-------------------------------------------------------------------------------

Addendum: 12/06/19 at 1743 by MIGUEL ENGLAND 

-------------------------------------------------------------------------------

Amended: Links added.

## 2019-12-06 NOTE — NUR
Opening Shift Note

Assumed care of patient, awake, alert, and oriented.  No S/S of distress/SOB or pain. Bed in 
lowest/locked position, bed rails up x2, call light within reach.  Instructed on POC and to 
call for assist PRN. Will continue to monitor for changes Q1hr and PRN.

## 2019-12-06 NOTE — NUR
WOUND CARE NOTE:

Wound care in to see patient per wound care request regarding "Left foot wound.." that are 
noted present on admission. Bedside nurse took photograph of patient's wound upon admission  
for reference. Patient is 69 years old male with admitting diagnosis of PVD. He's with 
history of CAD, CHF, CKF, COPD, DM, High Lipids, Htn.  Patient is resting in bed in Rm. 
273B. He's awake, alert and oriented. He's in no stated pain at this time. Patient's wife at 
bedside.Patient is self turn and reposition and his Obie score is 21.



Noted patient's L medial great toes has 2x1.8cm dry, blood blister,no drainage/odor 
noted,left open to air. He has open ulceration to L heel measuring 2x1.2x0.8cm. Wound is red 
with pink collagen scar tissue to willow wound , scant serosanguineous drainage noted, no odor 
noted.  Patient's wife at bedside reported that patient is under the care of Dr. Horton for 
his L heel wound and recommending Daily packing to left heel wound.  Cleansed patient's L 
heel wound with wound cleanser, patted dry with gauze, fill wound cavity with 1/4 inch 
iodoform packing strips,leaving an inch tail, padded with layer of gauze, wrapped with 
Kerlix and secured with tape. No pressure injury noted. Patient tolerated well. Patient and 
family wound care education provided, verbalized understanding. New photograph of patient's 
L heel and L great toe wounds are taken for reference.   



RECOMMENDATION: Daily/PRN dressing change to L heel wound per MD order, offload heels on 
pillows, continue monitoring by wound care while patient is hospitalized.

-------------------------------------------------------------------------------

Addendum: 12/06/19 at 1613 by Ida Pendleton RN

-------------------------------------------------------------------------------

Amended: Links added.

## 2019-12-06 NOTE — NUR
Discharge instructions given as ordered. Encourage to follow up with PMD as instructed. All 
questions and concerns addressed. Patient verbalized understanding.  IV removed with 
catheter intact, pressure dressing applied. Patient taken to vehicle via wheelchair with all 
personal belongings, accompanied by staff and family member. No distress noted at time of 
departure.

## 2021-04-22 ENCOUNTER — HOSPITAL ENCOUNTER (OUTPATIENT)
Dept: HOSPITAL 15 - CHF HDHVI | Age: 71
Discharge: HOME | End: 2021-04-22
Attending: INTERNAL MEDICINE
Payer: MEDICARE

## 2021-04-22 VITALS — DIASTOLIC BLOOD PRESSURE: 69 MMHG | SYSTOLIC BLOOD PRESSURE: 135 MMHG

## 2021-04-22 DIAGNOSIS — Z79.899: ICD-10-CM

## 2021-04-22 DIAGNOSIS — I50.23: ICD-10-CM

## 2021-04-22 DIAGNOSIS — I12.9: Primary | ICD-10-CM

## 2021-04-22 DIAGNOSIS — E11.9: ICD-10-CM

## 2021-04-22 DIAGNOSIS — R53.83: ICD-10-CM

## 2021-04-22 LAB
ALBUMIN SERPL-MCNC: 3.9 G/DL (ref 3.4–5)
ALP SERPL-CCNC: 169 U/L (ref 45–117)
ALT SERPL-CCNC: 28 U/L (ref 16–61)
ANION GAP SERPL CALCULATED.3IONS-SCNC: 6 MMOL/L (ref 5–15)
BILIRUB SERPL-MCNC: 0.3 MG/DL (ref 0.2–1)
BUN SERPL-MCNC: 22 MG/DL (ref 7–18)
BUN/CREAT SERPL: 16.2
CALCIUM SERPL-MCNC: 9.2 MG/DL (ref 8.5–10.1)
CHLORIDE SERPL-SCNC: 108 MMOL/L (ref 98–107)
CO2 SERPL-SCNC: 25 MMOL/L (ref 21–32)
GLUCOSE SERPL-MCNC: 133 MG/DL (ref 74–106)
HCT VFR BLD AUTO: 41.2 % (ref 41–53)
HGB BLD-MCNC: 13.3 G/DL (ref 13.5–17.5)
MAGNESIUM SERPL-MCNC: 2.1 MG/DL (ref 1.6–2.6)
MCH RBC QN AUTO: 29.2 PG (ref 28–32)
MCV RBC AUTO: 90.6 FL (ref 80–100)
NRBC BLD QL AUTO: 0.2 %
POTASSIUM SERPL-SCNC: 4.2 MMOL/L (ref 3.5–5.1)
PROT SERPL-MCNC: 7.9 G/DL (ref 6.4–8.2)
SODIUM SERPL-SCNC: 139 MMOL/L (ref 136–145)

## 2021-04-22 PROCEDURE — 83036 HEMOGLOBIN GLYCOSYLATED A1C: CPT

## 2021-04-22 PROCEDURE — 83880 ASSAY OF NATRIURETIC PEPTIDE: CPT

## 2021-04-22 PROCEDURE — 84403 ASSAY OF TOTAL TESTOSTERONE: CPT

## 2021-04-22 PROCEDURE — 80053 COMPREHEN METABOLIC PANEL: CPT

## 2021-04-22 PROCEDURE — 83735 ASSAY OF MAGNESIUM: CPT

## 2021-04-22 PROCEDURE — 82306 VITAMIN D 25 HYDROXY: CPT

## 2021-04-22 PROCEDURE — 82607 VITAMIN B-12: CPT

## 2021-04-22 PROCEDURE — 85025 COMPLETE CBC W/AUTO DIFF WBC: CPT

## 2021-04-22 PROCEDURE — 36415 COLL VENOUS BLD VENIPUNCTURE: CPT

## 2021-04-22 PROCEDURE — 96372 THER/PROPH/DIAG INJ SC/IM: CPT

## 2021-05-05 ENCOUNTER — HOSPITAL ENCOUNTER (OUTPATIENT)
Dept: HOSPITAL 15 - RAD HDHVI | Age: 71
Discharge: HOME | End: 2021-05-05
Attending: INTERNAL MEDICINE
Payer: MEDICARE

## 2021-05-05 DIAGNOSIS — R06.02: Primary | ICD-10-CM

## 2021-05-05 DIAGNOSIS — Z95.810: ICD-10-CM

## 2021-05-05 PROCEDURE — 71046 X-RAY EXAM CHEST 2 VIEWS: CPT

## 2021-05-10 ENCOUNTER — HOSPITAL ENCOUNTER (OUTPATIENT)
Dept: HOSPITAL 15 - RAD HDHVI | Age: 71
Discharge: HOME | End: 2021-05-10
Attending: INTERNAL MEDICINE
Payer: MEDICARE

## 2021-05-10 DIAGNOSIS — R07.89: Primary | ICD-10-CM

## 2021-05-10 DIAGNOSIS — R06.02: ICD-10-CM

## 2021-05-10 PROCEDURE — 93306 TTE W/DOPPLER COMPLETE: CPT

## 2023-05-13 NOTE — NUR
Patient Rounds

Patient resting in bed watching television. No s/s of distress. Will endorse care to night 
shift RN. [id number]